# Patient Record
Sex: FEMALE | Race: WHITE | NOT HISPANIC OR LATINO | Employment: OTHER | URBAN - METROPOLITAN AREA
[De-identification: names, ages, dates, MRNs, and addresses within clinical notes are randomized per-mention and may not be internally consistent; named-entity substitution may affect disease eponyms.]

---

## 2018-04-19 LAB — HBA1C MFR BLD HPLC: 6.3 %

## 2018-05-17 DIAGNOSIS — E11.8 TYPE 2 DIABETES MELLITUS WITH COMPLICATION, UNSPECIFIED WHETHER LONG TERM INSULIN USE: Primary | ICD-10-CM

## 2018-08-23 LAB
CREAT ?TM UR-SCNC: 28 UMOL/L
EXT MICROALBUMIN URINE RANDOM: 6.1
HBA1C MFR BLD HPLC: 9.2 %
MICROALBUMIN/CREAT UR: 21.8 MG/G{CREAT}

## 2018-11-19 LAB — HBA1C MFR BLD HPLC: 9 %

## 2018-12-11 LAB — HBA1C MFR BLD HPLC: 8.8 %

## 2018-12-13 LAB — HBA1C MFR BLD HPLC: 5.7 %

## 2019-03-15 ENCOUNTER — OFFICE VISIT (OUTPATIENT)
Dept: ENDOCRINOLOGY | Facility: HOSPITAL | Age: 69
End: 2019-03-15
Payer: COMMERCIAL

## 2019-03-15 VITALS
WEIGHT: 210.2 LBS | DIASTOLIC BLOOD PRESSURE: 70 MMHG | BODY MASS INDEX: 37.25 KG/M2 | SYSTOLIC BLOOD PRESSURE: 110 MMHG | HEIGHT: 63 IN | HEART RATE: 86 BPM

## 2019-03-15 DIAGNOSIS — E11.9 TYPE 2 DIABETES MELLITUS WITHOUT COMPLICATION, WITH LONG-TERM CURRENT USE OF INSULIN (HCC): Primary | ICD-10-CM

## 2019-03-15 DIAGNOSIS — I10 ESSENTIAL HYPERTENSION: ICD-10-CM

## 2019-03-15 DIAGNOSIS — E03.9 HYPOTHYROIDISM, UNSPECIFIED TYPE: ICD-10-CM

## 2019-03-15 DIAGNOSIS — E78.49 OTHER HYPERLIPIDEMIA: ICD-10-CM

## 2019-03-15 DIAGNOSIS — Z79.4 TYPE 2 DIABETES MELLITUS WITHOUT COMPLICATION, WITH LONG-TERM CURRENT USE OF INSULIN (HCC): Primary | ICD-10-CM

## 2019-03-15 PROCEDURE — 99204 OFFICE O/P NEW MOD 45 MIN: CPT | Performed by: INTERNAL MEDICINE

## 2019-03-15 RX ORDER — AMIODARONE HYDROCHLORIDE 200 MG/1
200 TABLET ORAL DAILY
COMMUNITY

## 2019-03-15 RX ORDER — SERTRALINE HYDROCHLORIDE 25 MG/1
75 TABLET, FILM COATED ORAL DAILY
COMMUNITY

## 2019-03-15 RX ORDER — METFORMIN HYDROCHLORIDE 500 MG/1
TABLET, EXTENDED RELEASE ORAL
Qty: 30 TABLET | Refills: 5 | Status: SHIPPED | OUTPATIENT
Start: 2019-03-15 | End: 2019-03-25

## 2019-03-15 RX ORDER — MIRTAZAPINE 15 MG/1
15 TABLET, FILM COATED ORAL
COMMUNITY

## 2019-03-15 RX ORDER — LATANOPROST 50 UG/ML
1 SOLUTION/ DROPS OPHTHALMIC DAILY
COMMUNITY

## 2019-03-15 RX ORDER — CHOLECALCIFEROL (VITAMIN D3) 125 MCG
5 CAPSULE ORAL DAILY PRN
COMMUNITY

## 2019-03-15 RX ORDER — ASPIRIN 81 MG/1
81 TABLET ORAL DAILY
COMMUNITY

## 2019-03-15 RX ORDER — POTASSIUM CHLORIDE 20 MEQ/1
20 TABLET, EXTENDED RELEASE ORAL
COMMUNITY

## 2019-03-15 RX ORDER — CLOTRIMAZOLE AND BETAMETHASONE DIPROPIONATE 10; .64 MG/G; MG/G
CREAM TOPICAL 2 TIMES DAILY
COMMUNITY

## 2019-03-15 RX ORDER — LEVOTHYROXINE SODIUM 112 UG/1
112 TABLET ORAL
COMMUNITY

## 2019-03-15 RX ORDER — MIDODRINE HYDROCHLORIDE 5 MG/1
5 TABLET ORAL 3 TIMES DAILY
COMMUNITY

## 2019-03-15 RX ORDER — ARIPIPRAZOLE 2 MG/1
2 TABLET ORAL DAILY
COMMUNITY

## 2019-03-15 RX ORDER — SPIRONOLACTONE 25 MG/1
25 TABLET ORAL DAILY
COMMUNITY

## 2019-03-15 RX ORDER — INSULIN GLARGINE 100 [IU]/ML
60 INJECTION, SOLUTION SUBCUTANEOUS DAILY
COMMUNITY
End: 2019-03-15

## 2019-03-15 RX ORDER — FUROSEMIDE 80 MG
80 TABLET ORAL 2 TIMES DAILY
COMMUNITY

## 2019-03-15 RX ORDER — LACTOBACILLUS RHAMNOSUS GG 10B CELL
1 CAPSULE ORAL DAILY
COMMUNITY
Start: 2018-05-13

## 2019-03-15 RX ORDER — ATORVASTATIN CALCIUM 10 MG/1
10 TABLET, FILM COATED ORAL DAILY
COMMUNITY

## 2019-03-15 RX ORDER — METOLAZONE 5 MG/1
5 TABLET ORAL
COMMUNITY

## 2019-03-15 RX ORDER — CARVEDILOL 3.12 MG/1
3.12 TABLET ORAL 2 TIMES DAILY
COMMUNITY

## 2019-03-15 NOTE — PROGRESS NOTES
3/15/2019    Assessment/Plan      Diagnoses and all orders for this visit:    Type 2 diabetes mellitus without complication, with long-term current use of insulin (HCC)  -     insulin aspart (NOVOLOG FLEXPEN) 100 Units/mL injection pen; 5 units with each meal plus sliding scale  -     insulin glargine (LANTUS SOLOSTAR) 100 units/mL injection pen; 35 units qhs  -     metFORMIN (GLUCOPHAGE-XR) 500 mg 24 hr tablet; 1 tab daily  Hypothyroidism, unspecified type    Essential hypertension    Other hyperlipidemia    Other orders  -     amiodarone 200 mg tablet; Take 200 mg by mouth daily  -     aspirin (ECOTRIN LOW STRENGTH) 81 mg EC tablet; Take 81 mg by mouth daily  -     atorvastatin (LIPITOR) 10 mg tablet; Take 10 mg by mouth daily  -     carvedilol (COREG) 3 125 mg tablet; Take 3 125 mg by mouth 2 (two) times a day  -     furosemide (LASIX) 80 mg tablet; Take 80 mg by mouth 2 (two) times a day  -     Discontinue: insulin aspart (NovoLOG) 100 units/mL injection; Inject under the skin Use per sliding scale  -     Discontinue: insulin glargine (LANTUS) 100 units/mL subcutaneous injection; Inject 60 Units under the skin daily  -     Lactobacillus Rhamnosus, GG, (CULTURELLE) CAPS; Take 1 capsule by mouth daily Only when taking antibiotics  -     levothyroxine 112 mcg tablet; Take 112 mcg by mouth 1 tablet on 6 days a week and 2 on Sunday   -     latanoprost (XALATAN) 0 005 % ophthalmic solution; Apply 1 drop to eye daily  -     Melatonin 5 MG TABS; Take 5 mg by mouth daily as needed  -     metolazone (ZAROXOLYN) 5 mg tablet; Take 5 mg by mouth Once weekly on Tuesday  -     potassium chloride (K-DUR,KLOR-CON) 20 mEq tablet; Take 20 mEq by mouth 2 tablets 3 times daily  -     spironolactone (ALDACTONE) 25 mg tablet; Take 25 mg by mouth daily  -     sertraline (ZOLOFT) 25 mg tablet; Take 75 mg by mouth daily  -     mirtazapine (REMERON) 15 mg tablet;  Take 15 mg by mouth daily at bedtime  -     ARIPiprazole (ABILIFY) 2 mg tablet; Take 2 mg by mouth daily Half tablet daily  -     midodrine (PROAMATINE) 5 mg tablet; Take 5 mg by mouth 3 (three) times a day  -     clotrimazole-betamethasone (LOTRISONE) 1-0 05 % cream; Apply topically 2 (two) times a day        Assessment/Plan:  1  Type 2 diabetes with long-term insulin use:  I would suggest adding a set dose of NovoLog 5 units with each meal   Continue current sliding scale 5 extra units for every 50 above 150  I suspect a sliding scale need to be reduced as blood sugars improve and he may less aggressive  Will increase Lantus to 35 units q h s   Resume metformin 500 mg daily  I asked the patient's assisted living the check blood sugars a c /hs and send in blood sugars in 2 weeks for review  Follow-up in 4 months  2  Hypothyroidism:  She will be having blood work through her PCP which will include thyroid function studies in about 1 month  3  Hyperlipidemia:  She will be having blood work next month  Continue current regimen for now  5  Hypertension:  Normotensive the office today on current regimen  CC: Diabetes Consult    History of Present Illness     HPI: Jhonny Peacock is a 70y o  year old female with type 2 diabetes for 10 years  She is on insulin at home and takes Lantus 28 units qhs, NovoLog SS 10 units for 200-250, 15 units 250-300 > 300 20 units  10 units with each meal +5 extra units for every 50 above 200  She denies any polyuria, polydipsia, nocturia and blurry vision  She denies nephropathy and retinopathy but does admit to neuropathy  Hypoglycemic episodes: No      Follows with eye eye doctor and podiatry regularly  Blood Sugar/Glucometer/Pump/CGM review:  Reviewed blood sugars from March 1st through March 15th  Morning fasting blood sugar is typically above 200  Occasionally it will be 180 190s  Highest blood sugars are typically before dinner  No hypoglycemia is recorded      She has a history of hypothyroidism related to surgery for a thyroid nodule that was benign on pathology in 2011 at [facility]   She is maintained on levothyroxine 112 mcg takes 1 tablet 6 days of the week and 2 tablets on Sundays  For hypertension takes spironolactone 25 mg daily  For hyperlipidemia takes atorvastatin 10 mg daily  Review of Systems   Constitutional: Negative for fatigue  HENT: Negative for trouble swallowing and voice change  Eyes: Negative for visual disturbance  Respiratory: Negative for shortness of breath  Cardiovascular: Negative for palpitations and leg swelling  Gastrointestinal: Negative for abdominal pain, nausea and vomiting  Endocrine: Negative for polydipsia and polyuria  Musculoskeletal: Negative for arthralgias and myalgias  Skin: Negative for rash  Neurological: Negative for dizziness, tremors and weakness  Hematological: Negative for adenopathy  Psychiatric/Behavioral: Negative for agitation and confusion  Historical Information   History reviewed  No pertinent past medical history  History reviewed  No pertinent surgical history    Social History   Social History     Substance and Sexual Activity   Alcohol Use Not on file     Social History     Substance and Sexual Activity   Drug Use Not on file     Social History     Tobacco Use   Smoking Status Former Smoker    Packs/day: 1 00    Years: 12 00    Pack years: 12 00    Types: Cigarettes    Last attempt to quit: 2016    Years since quitting: 3 2   Smokeless Tobacco Former User     Family History:   Family History   Problem Relation Age of Onset    Heart disease Mother     Hypertension Mother     Skin cancer Mother     Heart attack Father     Heart disease Father     Hypertension Brother     Skin cancer Brother        Meds/Allergies   Current Outpatient Medications   Medication Sig Dispense Refill    amiodarone 200 mg tablet Take 200 mg by mouth daily      ARIPiprazole (ABILIFY) 2 mg tablet Take 2 mg by mouth daily Half tablet daily  aspirin (ECOTRIN LOW STRENGTH) 81 mg EC tablet Take 81 mg by mouth daily      atorvastatin (LIPITOR) 10 mg tablet Take 10 mg by mouth daily      carvedilol (COREG) 3 125 mg tablet Take 3 125 mg by mouth 2 (two) times a day      clotrimazole-betamethasone (LOTRISONE) 1-0 05 % cream Apply topically 2 (two) times a day      furosemide (LASIX) 80 mg tablet Take 80 mg by mouth 2 (two) times a day      Lactobacillus Rhamnosus, GG, (CULTURELLE) CAPS Take 1 capsule by mouth daily Only when taking antibiotics   latanoprost (XALATAN) 0 005 % ophthalmic solution Apply 1 drop to eye daily      levothyroxine 112 mcg tablet Take 112 mcg by mouth 1 tablet on 6 days a week and 2 on Sunday   Melatonin 5 MG TABS Take 5 mg by mouth daily as needed      metolazone (ZAROXOLYN) 5 mg tablet Take 5 mg by mouth Once weekly on Tuesday   midodrine (PROAMATINE) 5 mg tablet Take 5 mg by mouth 3 (three) times a day      mirtazapine (REMERON) 15 mg tablet Take 15 mg by mouth daily at bedtime      NOVOFINE 32G X 6 MM MISC use three times a day as directed 100 each 1    potassium chloride (K-DUR,KLOR-CON) 20 mEq tablet Take 20 mEq by mouth 2 tablets 3 times daily      sertraline (ZOLOFT) 25 mg tablet Take 75 mg by mouth daily      spironolactone (ALDACTONE) 25 mg tablet Take 25 mg by mouth daily      insulin aspart (NOVOLOG FLEXPEN) 100 Units/mL injection pen 5 units with each meal plus sliding scale  5 pen 5    insulin glargine (LANTUS SOLOSTAR) 100 units/mL injection pen 35 units qhs 5 pen 5    metFORMIN (GLUCOPHAGE-XR) 500 mg 24 hr tablet 1 tab daily  30 tablet 5     No current facility-administered medications for this visit  Allergies   Allergen Reactions    Codeine     Oxycodone-Acetaminophen     Propoxyphene     Medical Tape Rash       Objective   Vitals: Blood pressure 110/70, pulse 86, height 5' 2 76" (1 594 m), weight 95 3 kg (210 lb 3 2 oz)    Invasive Devices          None          Physical Exam Constitutional: She is oriented to person, place, and time  She appears well-developed and well-nourished  No distress  HENT:   Head: Normocephalic and atraumatic  Neck: Normal range of motion  Neck supple  No thyromegaly present  Cardiovascular: Normal rate and regular rhythm  No murmur heard  Pulses:       Dorsalis pedis pulses are 2+ on the right side, and 2+ on the left side  Posterior tibial pulses are 2+ on the right side, and 2+ on the left side  Pulmonary/Chest: Effort normal and breath sounds normal    Abdominal: Soft  Bowel sounds are normal    Musculoskeletal: Normal range of motion  She exhibits no edema  Feet:   Right Foot:   Skin Integrity: Negative for ulcer, skin breakdown, erythema, warmth, callus or dry skin  Left Foot:   Skin Integrity: Negative for ulcer, skin breakdown, erythema, warmth, callus or dry skin  Lymphadenopathy:     She has no cervical adenopathy  Neurological: She is alert and oriented to person, place, and time  She exhibits normal muscle tone  Skin: Skin is warm and dry  No rash noted  She is not diaphoretic  Psychiatric: She has a normal mood and affect  Her behavior is normal    Vitals reviewed  Patient's shoes and socks removed  Right Foot/Ankle   Right Foot Inspection  Skin Exam: skin normal and skin intact no dry skin, no warmth, no callus, no erythema, no maceration, no abnormal color, no pre-ulcer, no ulcer and no callus                            Sensory   Vibration: intact    Monofilament testing: intact  Vascular    The right DP pulse is 2+  The right PT pulse is 2+  Left Foot/Ankle  Left Foot Inspection  Skin Exam: skin normal and skin intactno dry skin, no warmth, no erythema, no maceration, normal color, no pre-ulcer, no ulcer and no callus                                         Sensory   Vibration: intact    Monofilament: intact  Vascular    The left DP pulse is 2+  The left PT pulse is 2+     Assign Risk Category:  No deformity present; No loss of protective sensation;        Risk: 0        The history was obtained from the review of the chart and from the patient  Lab Results:   Labs from 03/05/2019: These labs are difficult to read based on the blurry fax but it looks like glucose was 192, BUN 14, creatinine 0 97, GFR 60, sodium 143, potassium 4 8, calcium 8 9, hemoglobin 12 9, hematocrit 39 2, white blood cells 8 1, platelets 742  Labs from 12/13/2018:  A1c 8 7  No future appointments  Portions of the record may have been created with voice recognition software  Occasional wrong word or "sound a like" substitutions may have occurred due to the inherent limitations of voice recognition software  Read the chart carefully and recognize, using context, where substitutions have occurred

## 2019-03-15 NOTE — PATIENT INSTRUCTIONS
INSULIN DOSAGE INSTRUCTIONS    Name: Cindy Hu                        : 1950  MRN #: [id number]    Your Current Insulin  and dose is: Before Breakfast Before Lunch Before Evening Meal Bedtime          Sliding Scale Extra Novolog   <80 0 0 0 0    0 0 0 0   151-200 +5  +5 +5 0   201-250 +10 +10 +10 0   251-300 +15 +15 +15 0   301-350 +20 +20 +20 0   >350 +25 +25 +25 0     Please check blood sugars before meals and at bedtime and submit blood sugars to [phone] (fax) in 2 weeks